# Patient Record
Sex: MALE | Race: ASIAN | NOT HISPANIC OR LATINO | URBAN - METROPOLITAN AREA
[De-identification: names, ages, dates, MRNs, and addresses within clinical notes are randomized per-mention and may not be internally consistent; named-entity substitution may affect disease eponyms.]

---

## 2017-04-06 VITALS
OXYGEN SATURATION: 98 % | HEART RATE: 89 BPM | TEMPERATURE: 98 F | DIASTOLIC BLOOD PRESSURE: 86 MMHG | RESPIRATION RATE: 14 BRPM | SYSTOLIC BLOOD PRESSURE: 174 MMHG | HEIGHT: 66 IN

## 2017-04-06 RX ORDER — CHLORHEXIDINE GLUCONATE 213 G/1000ML
1 SOLUTION TOPICAL ONCE
Qty: 0 | Refills: 0 | Status: DISCONTINUED | OUTPATIENT
Start: 2017-04-11 | End: 2017-04-11

## 2017-04-06 NOTE — H&P ADULT - HISTORY OF PRESENT ILLNESS
SKELETON    58 yo M PMHx *SEVERE CONTRAST DYE ALLERGY--needs rx premedication* CAD s/p MI (7 y ago, [facility]), DM, HTN, HLD, presented to cardiologist for pre-operative clearance prior to colonoscopy. Pt admits to some MOREIRA relieved with rest, and also states that he was recommended for ?CABG but could not 2/2 dye allergy.   Denies  Echo 3/7/17 dilated LA LVH with normal LV wall motion, LVEF 62% mild aortic and mitral regurg, abnormal LV compliance  NST 3/9/17 small area of inferior wall ischemia   In light of CCS III angina symptoms and abnormal NST patient recommended for cardiac catheterization with intervention if clinically indicated for cardiac clearance prior to colonoscopy. *****SKELETON- DOCTORS'S NOTE IN CONSISTENT WITH WHAT PATIENT"S FAMILY REPORTS.  PATIENT NOT AVAILABLE TO SPEAK WITH AT THE TIME.   ON ALL MEDICAL RECORDS MATCHES WITH PATIENT.          ***NEEDS PREMEDICATION UPON ARRIVAL       **PT TO BRING IN ALL MEDS       57 y.o Higinio Speaking Male with SEVERE CONTRAST DYE ALLERGY with PMHx of HTN, dyslipidemia, NIDDM, Known CAD s/p PCI Vs CABG @ [facility], s/p MI (7 y ago, [facility]), DM, HTN, HLD, presented to cardiologist for pre-operative clearance prior to colonoscopy. Pt admits to some MOREIRA relieved with rest, and also states that he was recommended for ?CABG but could not 2/2 dye allergy.   Denies  Echo 3/7/17 dilated LA LVH with normal LV wall motion, LVEF 62% mild aortic and mitral regurg, abnormal LV compliance  NST 3/9/17 small area of inferior wall ischemia   In light of CCS III angina symptoms and abnormal NST patient recommended for cardiac catheterization with intervention if clinically indicated for cardiac clearance prior to colonoscopy. *****SKELETON- DOCTORS'S NOTE IN CONSISTENT WITH WHAT PATIENT"S FAMILY REPORTS.  PATIENT NOT AVAILABLE TO SPEAK WITH AT THE TIME.   ON ALL MEDICAL RECORDS MATCHES WITH PATIENT.          ***NEEDS PREMEDICATION FOR CONTRAST DYE ALLERGY UPON ARRIVAL       **PT TO BRING IN ALL MEDS       57 y.o Higinio Speaking Male with SEVERE CONTRAST DYE ALLERGY with PMHx of HTN, dyslipidemia, NIDDM, Known CAD s/p PCI Vs CABG @ [facility], s/p MI (7 y ago, [facility]), DM, HTN, HLD, presented to cardiologist for pre-operative clearance prior to colonoscopy. Pt admits to some MOREIRA relieved with rest, and also states that he was recommended for ?CABG but could not 2/2 dye allergy.   Denies  Echo 3/7/17 dilated LA LVH with normal LV wall motion, LVEF 62% mild aortic and mitral regurg, abnormal LV compliance  NST 3/9/17 small area of inferior wall ischemia   In light of CCS III angina symptoms and abnormal NST patient recommended for cardiac catheterization with intervention if clinically indicated for cardiac clearance prior to colonoscopy. ***NEEDS PREMEDICATION FOR CONTRAST DYE ALLERGY UPON ARRIVAL     57 y.o Higinio Speaking Male POOR HISTORIAN, with CONTRAST DYE ALLERGY with PMHx of HTN, dyslipidemia, NIDDM, ?Diagnostic Cath @ [facility] > 10yrs ago (No record @ [place]) who presented to his cardiologist, Dr. Arash Blake, for  routine annual Cardiac follow-up.  Pt reports doing well  with no recent decline in his exercise tolerance.  He states  he walks daily       OF NOTE:  As per MD note, during office consultation, pt reported MOREIRA .  Echo 3/7/17 revealed  dilated LA LVH with normal LV wall motion, LVEF 62% mild aortic and mitral regurg, abnormal LV compliance.  Pt also had a Nuclear Stress test done on 03/9/17  revealing small area of inferior wall ischemia.       In light of pt's risk factors, Cd abnormal NST patient recommended for cardiac catheterization with intervention if clinically indicated for cardiac clearance prior to colonoscopy. ***NEEDS PREMEDICATION FOR CONTRAST DYE ALLERGY UPON ARRIVAL       57 y.o Higinio Speaking Male POOR HISTORIAN, with CONTRAST DYE ALLERGY with PMHx of HTN, dyslipidemia, NIDDM, ?Diagnostic Cath @ [facility] > 10yrs ago (No record @ [place]) who presented to his cardiologist, Dr. Arash Blake, for  cardiac clearance prior to anticipated Colonoscopy  Pt reports doing well  with no recent decline in his exercise tolerance.  He states  he walks several blocks daily as part of his exercise regimen with out illiciting  any anginal or anginal equivalent symptoms.  OF NOTE:  As per MD note, during office consultation pt reported MOREIRA .  Echo 3/7/17 revealed  dilated LA LVH with normal LV wall motion, LVEF 62% mild aortic and mitral regurg, abnormal LV compliance.  Pt also had a Nuclear Stress test done on 03/9/17  revealing small area of inferior wall ischemia.       In light of pt's risk factors, CCS Anginal Class 3 Equivalent Symptoms,  and abnormal Stress test, patient is recommended for Cardiac Catheterization with intervention if clinically indicated  in the setting of risk stratification prior to colonoscopy. ***NEEDS PREMEDICATION FOR CONTRAST DYE ALLERGY UPON ARRIVAL       57 y.o Higinio Speaking Male POOR HISTORIAN, with CONTRAST DYE ALLERGY with PMHx of HTN, dyslipidemia, NIDDM, ?Diagnostic Cath @ [facility] > 10yrs ago (No record @ [place]) who was recently seen and edvalauted  presented to his cardiologist, Dr. Arash Blake, for  cardiac clearance prior to anticipated Colonoscopy  Pt reports doing well  with no recent decline in his exercise tolerance.  He states  he walks several blocks daily as part of his exercise regimen with out illiciting  any anginal or anginal equivalent symptoms.  OF NOTE:  As per MD note, during office consultation pt reported MOREIRA .  Echo 3/7/17 revealed  dilated LA LVH with normal LV wall motion, LVEF 62% mild aortic and mitral regurg, abnormal LV compliance.  Pt also had a Nuclear Stress test done on 03/9/17  revealing small area of inferior wall ischemia.       In light of pt's risk factors, CCS Anginal Class 3 Equivalent Symptoms,  and abnormal Stress test, patient is recommended for Cardiac Catheterization with intervention if clinically indicated  in the setting of risk stratification prior to colonoscopy. ***NEEDS PREMEDICATION FOR CONTRAST DYE ALLERGY UPON ARRIVAL       57 y.o Higinio Speaking Male POOR HISTORIAN, with CONTRAST DYE ALLERGY with PMHx of HTN, dyslipidemia, NIDDM, ?Diagnostic Cath @ [facility] > 10yrs ago (No record @ [place]) who was recently seen and evaluated  by his cardiologist, Dr. Arash Blake, for  cardiac clearance prior to anticipated Colonoscopy  Pt reports doing well  with no recent decline in his exercise tolerance.  He states  he walks several blocks daily as part of his exercise regimen with out illiciting  any anginal or anginal equivalent symptoms.  OF NOTE:  As per MD note, during office consultation pt reported MOREIRA .  Echo 3/7/17 revealed  dilated LA LVH with normal LV wall motion, LVEF 62% mild aortic and mitral regurg, abnormal LV compliance.  Pt also had a Nuclear Stress test done on 03/9/17  revealing small area of inferior wall ischemia.       In light of pt's risk factors, CCS Anginal Class 3 Equivalent Symptoms,  and abnormal Stress test, patient is recommended for Cardiac Catheterization with intervention if clinically indicated  in the setting of risk stratification prior to colonoscopy. ***NEEDS PREMEDICATION FOR CONTRAST DYE ALLERGY UPON ARRIVAL       57 y.o Higinio Speaking Male POOR HISTORIAN, with CONTRAST DYE ALLERGY with PMHx of HTN, dyslipidemia, NIDDM, ?Diagnostic Cath @ [facility] > 10yrs ago during which time procedure was aborted due contrast dye allergy  (No record @ [place]) who was recently seen and evaluated  by his cardiologist, Dr. Arash Blake, for  cardiac clearance prior to anticipated Colonoscopy.   Pt reports doing well  with no recent decline in his exercise tolerance.  He states he walks several blocks daily as part of his exercise regimen with out illiciting  any anginal or anginal equivalent symptoms.  OF NOTE:  As per MD note, during office consultation pt reported MOREIRA .  Echo 3/7/17 revealed  dilated LA, LVH with normal LV wall motion, LVEF 62% mild aortic and mitral regurg, abnormal LV compliance.  Pt also had a Nuclear Stress test done on 03/9/17  revealing small area of inferior wall ischemia.       In light of pt's risk factors, CCS Anginal Class 3 Equivalent Symptoms,  and abnormal Stress test, patient is recommended for Cardiac Catheterization with intervention if clinically indicated  in the setting of risk stratification prior to colonoscopy. ***NEEDS PREMEDICATION FOR CONTRAST DYE ALLERGY UPON ARRIVAL       57 y.o Higinio Speaking Male POOR HISTORIAN, with CONTRAST DYE ALLERGY, PMHx of HTN, dyslipidemia, NIDDM, ?Diagnostic Cath @ [facility] > 10yrs ago during which time procedure was aborted due contrast dye allergy  (No record @ [place]) who was recently seen and evaluated  by his cardiologist, Dr. Arash Blake, for  cardiac clearance prior to anticipated Colonoscopy.   Pt reports doing well  with no recent decline in his exercise tolerance.  He states he walks several blocks daily as part of his exercise regimen with out illiciting  any anginal or anginal equivalent symptoms.  OF NOTE:  As per MD note, during office consultation pt reported MOREIRA .  Echo 3/7/17 revealed  dilated LA, LVH with normal LV wall motion, LVEF 62% mild aortic and mitral regurg, abnormal LV compliance.  Pt also had a Nuclear Stress test done on 03/9/17  revealing small area of inferior wall ischemia.       In light of pt's risk factors, CCS Anginal Class 3 Equivalent Symptoms,  and abnormal Stress test, patient is recommended for Cardiac Catheterization with intervention if clinically indicated  in the setting of risk stratification prior to colonoscopy. 57 y.o Higinio Speaking Male POOR HISTORIAN, with CONTRAST DYE ALLERGY, PMHx of HTN, dyslipidemia, NIDDM, ?Diagnostic Cath @ [facility] > 10yrs ago during which time procedure was aborted due contrast dye allergy  (No record @ [place]) who was recently seen and evaluated  by his cardiologist, Dr. Arash lBake, for  cardiac clearance prior to anticipated Colonoscopy.   Pt reports doing well  with no recent decline in his exercise tolerance.  He states he walks several blocks daily as part of his exercise regimen with out illiciting  any anginal or anginal equivalent symptoms.  OF NOTE:  As per MD note, during office consultation pt reported MOREIRA .  Echo 3/7/17 revealed  dilated LA, LVH with normal LV wall motion, LVEF 62% mild aortic and mitral regurg, abnormal LV compliance.  Pt also had a Nuclear Stress test done on 03/9/17  revealing small area of inferior wall ischemia.       In light of pt's risk factors, CCS Anginal Class 3 Equivalent Symptoms,  and abnormal Stress test, patient is recommended for Cardiac Catheterization with intervention if clinically indicated  in the setting of risk stratification prior to colonoscopy.

## 2017-04-06 NOTE — H&P ADULT - ASSESSMENT
57 y.o Higinio Speaking Male POOR HISTORIAN, with CONTRAST DYE ALLERGY, PMHx of HTN, dyslipidemia, NIDDM, ?Diagnostic Cath @ [facility] > 10yrs ago during which time procedure was aborted due contrast dye allergy  (No record @ [place]) who presents for recommended cardiac cath for cardiac clearance prior to colonoscopy due to patient's risk factors, abnormal stress test, and CCS Anginal Class III Equivalent Symptoms.    ASA III                  Mallampati III  Risks & benefits of procedure and alternative therapy have been explained to the patient including but not limited to: allergic reaction, bleeding w/possible need for blood transfusion, infection, renal and vascular compromise, limb damage, arrhythmia, stroke, vessel dissection/perforation, Myocardial infarction, emergent CABG. Informed consent obtained and in chart.       Solucortef 200 mg IV x 1 ordered for premedication for contrast allergy. Benadryl 50 mg IV x 1 on call to cath lab. 57 y.o Higinio Speaking Male POOR HISTORIAN, with CONTRAST DYE ALLERGY, PMHx of HTN, dyslipidemia, NIDDM, ?Diagnostic Cath @ [facility] > 10yrs ago during which time procedure was aborted due contrast dye allergy  (No record @ [place]) who presents for recommended cardiac cath for cardiac clearance prior to colonoscopy due to patient's risk factors, abnormal stress test, and CCS Anginal Class III Equivalent Symptoms.    ASA III                  Mallampati III  Risks & benefits of procedure and alternative therapy have been explained to the patient including but not limited to: allergic reaction, bleeding w/possible need for blood transfusion, infection, renal and vascular compromise, limb damage, arrhythmia, stroke, vessel dissection/perforation, Myocardial infarction, emergent CABG. Informed consent obtained and in chart.       Solucortef 200 mg IV x 1 ordered for premedication for contrast allergy. Benadryl 50 mg IV x 1 on call to cath lab.     CBC stable Hgb/HCT 13.8/39.8-ASA  mg PO x 1 and Plavix 600 mg PO x given prior to procedure 57 y.o Higinio Speaking Male POOR HISTORIAN, with CONTRAST DYE ALLERGY, PMHx of HTN, dyslipidemia, NIDDM, ?Diagnostic Cath @ [facility] > 10yrs ago during which time procedure was aborted due contrast dye allergy  (No record @ [place]) who presents for recommended cardiac cath for cardiac clearance prior to colonoscopy due to patient's risk factors, abnormal stress test, and CCS Anginal Class III Equivalent Symptoms.    ASA III                  Mallampati III  Risks & benefits of procedure and alternative therapy have been explained to the patient including but not limited to: allergic reaction, bleeding w/possible need for blood transfusion, infection, renal and vascular compromise, limb damage, arrhythmia, stroke, vessel dissection/perforation, Myocardial infarction, emergent CABG. Informed consent obtained and in chart.     EKG : NSR at 89 bpm. 0.5 mm J point elevation V2-V3. TWI AVL. +LVH  Solucortef 200 mg IV x 1 ordered for premedication for contrast allergy. Benadryl 50 mg IV x 1 on call to cath lab.     CBC stable Hgb/HCT 13.8/39.8-ASA  mg PO x 1 and Plavix 600 mg PO x given prior to procedure

## 2017-04-10 RX ORDER — LOSARTAN POTASSIUM 100 MG/1
1 TABLET, FILM COATED ORAL
Qty: 0 | Refills: 0 | COMMUNITY

## 2017-04-10 RX ORDER — METOPROLOL TARTRATE 50 MG
1 TABLET ORAL
Qty: 0 | Refills: 0 | COMMUNITY

## 2017-04-10 RX ORDER — ASPIRIN/CALCIUM CARB/MAGNESIUM 324 MG
1 TABLET ORAL
Qty: 0 | Refills: 0 | COMMUNITY

## 2017-04-10 RX ORDER — GABAPENTIN 400 MG/1
0 CAPSULE ORAL
Qty: 0 | Refills: 0 | COMMUNITY

## 2017-04-10 RX ORDER — ISOSORBIDE MONONITRATE 60 MG/1
1 TABLET, EXTENDED RELEASE ORAL
Qty: 0 | Refills: 0 | COMMUNITY

## 2017-04-11 ENCOUNTER — OUTPATIENT (OUTPATIENT)
Dept: OUTPATIENT SERVICES | Facility: HOSPITAL | Age: 57
LOS: 1 days | Discharge: NURSING, REHAB OR LONG-TERM CARE | End: 2017-04-11
Payer: COMMERCIAL

## 2017-04-11 DIAGNOSIS — I25.110 ATHEROSCLEROTIC HEART DISEASE OF NATIVE CORONARY ARTERY WITH UNSTABLE ANGINA PECTORIS: ICD-10-CM

## 2017-04-11 DIAGNOSIS — E78.5 HYPERLIPIDEMIA, UNSPECIFIED: ICD-10-CM

## 2017-04-11 DIAGNOSIS — I10 ESSENTIAL (PRIMARY) HYPERTENSION: ICD-10-CM

## 2017-04-11 DIAGNOSIS — E11.9 TYPE 2 DIABETES MELLITUS WITHOUT COMPLICATIONS: ICD-10-CM

## 2017-04-11 DIAGNOSIS — R94.39 ABNORMAL RESULT OF OTHER CARDIOVASCULAR FUNCTION STUDY: ICD-10-CM

## 2017-04-11 LAB
ALBUMIN SERPL ELPH-MCNC: 4.1 G/DL — SIGNIFICANT CHANGE UP (ref 3.4–5)
ALP SERPL-CCNC: 72 U/L — SIGNIFICANT CHANGE UP (ref 40–120)
ALT FLD-CCNC: 35 U/L — SIGNIFICANT CHANGE UP (ref 12–42)
ANION GAP SERPL CALC-SCNC: 6 MMOL/L — LOW (ref 9–16)
APTT BLD: 33.4 SEC — SIGNIFICANT CHANGE UP (ref 27.5–37.4)
AST SERPL-CCNC: 15 U/L — SIGNIFICANT CHANGE UP (ref 15–37)
BASOPHILS NFR BLD AUTO: 0.3 % — SIGNIFICANT CHANGE UP (ref 0–2)
BILIRUB DIRECT SERPL-MCNC: 0.3 MG/DL — HIGH
BILIRUB INDIRECT FLD-MCNC: 0.1 MG/DL — LOW (ref 0.2–1)
BILIRUB SERPL-MCNC: 0.4 MG/DL — SIGNIFICANT CHANGE UP (ref 0.2–1.2)
BUN SERPL-MCNC: 15 MG/DL — SIGNIFICANT CHANGE UP (ref 7–23)
CALCIUM SERPL-MCNC: 9.5 MG/DL — SIGNIFICANT CHANGE UP (ref 8.5–10.5)
CHLORIDE SERPL-SCNC: 108 MMOL/L — SIGNIFICANT CHANGE UP (ref 96–108)
CHOLEST SERPL-MCNC: 163 MG/DL — SIGNIFICANT CHANGE UP
CK MB CFR SERPL CALC: 1.1 NG/ML — SIGNIFICANT CHANGE UP (ref 0.5–3.6)
CO2 SERPL-SCNC: 28 MMOL/L — SIGNIFICANT CHANGE UP (ref 22–31)
CREAT SERPL-MCNC: 0.77 MG/DL — SIGNIFICANT CHANGE UP (ref 0.5–1.3)
CRP SERPL-MCNC: <0.29 MG/DL — SIGNIFICANT CHANGE UP
EOSINOPHIL NFR BLD AUTO: 6.2 % — HIGH (ref 0–6)
GLUCOSE SERPL-MCNC: 146 MG/DL — HIGH (ref 70–99)
HBA1C BLD-MCNC: 6.6 % — HIGH (ref 4.8–5.6)
HCT VFR BLD CALC: 39.3 % — SIGNIFICANT CHANGE UP (ref 39–50)
HDLC SERPL-MCNC: 58 MG/DL — SIGNIFICANT CHANGE UP
HGB BLD-MCNC: 13.8 G/DL — SIGNIFICANT CHANGE UP (ref 13–17)
INR BLD: 0.96 — SIGNIFICANT CHANGE UP (ref 0.88–1.16)
LIPID PNL WITH DIRECT LDL SERPL: 82 MG/DL — SIGNIFICANT CHANGE UP
LYMPHOCYTES # BLD AUTO: 54.3 % — HIGH (ref 13–44)
MCHC RBC-ENTMCNC: 31.9 PG — SIGNIFICANT CHANGE UP (ref 27–34)
MCHC RBC-ENTMCNC: 35.1 G/DL — SIGNIFICANT CHANGE UP (ref 32–36)
MCV RBC AUTO: 91 FL — SIGNIFICANT CHANGE UP (ref 80–100)
MONOCYTES NFR BLD AUTO: 7.7 % — SIGNIFICANT CHANGE UP (ref 2–14)
NEUTROPHILS NFR BLD AUTO: 31.5 % — LOW (ref 43–77)
PLATELET # BLD AUTO: 159 K/UL — SIGNIFICANT CHANGE UP (ref 150–400)
POTASSIUM SERPL-MCNC: 4.2 MMOL/L — SIGNIFICANT CHANGE UP (ref 3.5–5.3)
POTASSIUM SERPL-SCNC: 4.2 MMOL/L — SIGNIFICANT CHANGE UP (ref 3.5–5.3)
PROT SERPL-MCNC: 7.8 G/DL — SIGNIFICANT CHANGE UP (ref 6.4–8.2)
PROTHROM AB SERPL-ACNC: 10.7 SEC — SIGNIFICANT CHANGE UP (ref 9.8–12.7)
RBC # BLD: 4.32 M/UL — SIGNIFICANT CHANGE UP (ref 4.2–5.8)
RBC # FLD: 12.4 % — SIGNIFICANT CHANGE UP (ref 10.3–16.9)
SODIUM SERPL-SCNC: 142 MMOL/L — SIGNIFICANT CHANGE UP (ref 135–145)
TOTAL CHOLESTEROL/HDL RATIO MEASUREMENT: 2.8 RATIO — SIGNIFICANT CHANGE UP
TRIGL SERPL-MCNC: 115 MG/DL — SIGNIFICANT CHANGE UP
WBC # BLD: 6.1 K/UL — SIGNIFICANT CHANGE UP (ref 3.8–10.5)
WBC # FLD AUTO: 6.1 K/UL — SIGNIFICANT CHANGE UP (ref 3.8–10.5)

## 2017-04-11 PROCEDURE — 80061 LIPID PANEL: CPT

## 2017-04-11 PROCEDURE — 80048 BASIC METABOLIC PNL TOTAL CA: CPT

## 2017-04-11 PROCEDURE — C1769: CPT

## 2017-04-11 PROCEDURE — 85730 THROMBOPLASTIN TIME PARTIAL: CPT

## 2017-04-11 PROCEDURE — 93005 ELECTROCARDIOGRAM TRACING: CPT

## 2017-04-11 PROCEDURE — 82553 CREATINE MB FRACTION: CPT

## 2017-04-11 PROCEDURE — 82550 ASSAY OF CK (CPK): CPT

## 2017-04-11 PROCEDURE — 36415 COLL VENOUS BLD VENIPUNCTURE: CPT

## 2017-04-11 PROCEDURE — 85025 COMPLETE CBC W/AUTO DIFF WBC: CPT

## 2017-04-11 PROCEDURE — C1887: CPT

## 2017-04-11 PROCEDURE — 80076 HEPATIC FUNCTION PANEL: CPT

## 2017-04-11 PROCEDURE — 85610 PROTHROMBIN TIME: CPT

## 2017-04-11 PROCEDURE — 83036 HEMOGLOBIN GLYCOSYLATED A1C: CPT

## 2017-04-11 PROCEDURE — 93458 L HRT ARTERY/VENTRICLE ANGIO: CPT

## 2017-04-11 PROCEDURE — 93458 L HRT ARTERY/VENTRICLE ANGIO: CPT | Mod: 26

## 2017-04-11 PROCEDURE — 93010 ELECTROCARDIOGRAM REPORT: CPT

## 2017-04-11 PROCEDURE — 86140 C-REACTIVE PROTEIN: CPT

## 2017-04-11 RX ORDER — GLUCAGON INJECTION, SOLUTION 0.5 MG/.1ML
1 INJECTION, SOLUTION SUBCUTANEOUS ONCE
Qty: 0 | Refills: 0 | Status: DISCONTINUED | OUTPATIENT
Start: 2017-04-11 | End: 2017-04-11

## 2017-04-11 RX ORDER — ASPIRIN/CALCIUM CARB/MAGNESIUM 324 MG
325 TABLET ORAL ONCE
Qty: 0 | Refills: 0 | Status: COMPLETED | OUTPATIENT
Start: 2017-04-11 | End: 2017-04-11

## 2017-04-11 RX ORDER — DEXTROSE 50 % IN WATER 50 %
12.5 SYRINGE (ML) INTRAVENOUS ONCE
Qty: 0 | Refills: 0 | Status: DISCONTINUED | OUTPATIENT
Start: 2017-04-11 | End: 2017-04-11

## 2017-04-11 RX ORDER — CLOPIDOGREL BISULFATE 75 MG/1
600 TABLET, FILM COATED ORAL ONCE
Qty: 0 | Refills: 0 | Status: COMPLETED | OUTPATIENT
Start: 2017-04-11 | End: 2017-04-11

## 2017-04-11 RX ORDER — DEXTROSE 50 % IN WATER 50 %
25 SYRINGE (ML) INTRAVENOUS ONCE
Qty: 0 | Refills: 0 | Status: DISCONTINUED | OUTPATIENT
Start: 2017-04-11 | End: 2017-04-11

## 2017-04-11 RX ORDER — HYDROCORTISONE 20 MG
200 TABLET ORAL ONCE
Qty: 0 | Refills: 0 | Status: COMPLETED | OUTPATIENT
Start: 2017-04-11 | End: 2017-04-11

## 2017-04-11 RX ORDER — SODIUM CHLORIDE 9 MG/ML
500 INJECTION INTRAMUSCULAR; INTRAVENOUS; SUBCUTANEOUS
Qty: 0 | Refills: 0 | Status: DISCONTINUED | OUTPATIENT
Start: 2017-04-11 | End: 2017-04-11

## 2017-04-11 RX ORDER — DEXTROSE 50 % IN WATER 50 %
1 SYRINGE (ML) INTRAVENOUS ONCE
Qty: 0 | Refills: 0 | Status: DISCONTINUED | OUTPATIENT
Start: 2017-04-11 | End: 2017-04-11

## 2017-04-11 RX ORDER — SODIUM CHLORIDE 9 MG/ML
1000 INJECTION, SOLUTION INTRAVENOUS
Qty: 0 | Refills: 0 | Status: DISCONTINUED | OUTPATIENT
Start: 2017-04-11 | End: 2017-04-11

## 2017-04-11 RX ORDER — SODIUM CHLORIDE 9 MG/ML
1000 INJECTION INTRAMUSCULAR; INTRAVENOUS; SUBCUTANEOUS
Qty: 0 | Refills: 0 | Status: DISCONTINUED | OUTPATIENT
Start: 2017-04-11 | End: 2017-04-11

## 2017-04-11 RX ORDER — FUROSEMIDE 40 MG
1 TABLET ORAL
Qty: 30 | Refills: 3 | OUTPATIENT
Start: 2017-04-11 | End: 2017-08-08

## 2017-04-11 RX ORDER — INSULIN LISPRO 100/ML
VIAL (ML) SUBCUTANEOUS ONCE
Qty: 0 | Refills: 0 | Status: COMPLETED | OUTPATIENT
Start: 2017-04-11 | End: 2017-04-11

## 2017-04-11 RX ORDER — DIPHENHYDRAMINE HCL 50 MG
50 CAPSULE ORAL ONCE
Qty: 0 | Refills: 0 | Status: DISCONTINUED | OUTPATIENT
Start: 2017-04-11 | End: 2017-04-11

## 2017-04-11 RX ADMIN — SODIUM CHLORIDE 75 MILLILITER(S): 9 INJECTION INTRAMUSCULAR; INTRAVENOUS; SUBCUTANEOUS at 09:43

## 2017-04-11 RX ADMIN — CLOPIDOGREL BISULFATE 600 MILLIGRAM(S): 75 TABLET, FILM COATED ORAL at 10:04

## 2017-04-11 RX ADMIN — Medication 325 MILLIGRAM(S): at 10:04

## 2017-04-11 RX ADMIN — Medication 200 MILLIGRAM(S): at 09:43

## 2017-04-11 NOTE — PROGRESS NOTE ADULT - SUBJECTIVE AND OBJECTIVE BOX
Interventional Cardiology PA SDA Discharge Note    Patient without complaints. Ambulated and voided without difficulties    Afebrile, VSS    Ext:    	Right  Radial : no hematoma,  no   bleeding, dressing; C/D/I      Pulses:    intact RAD/DP/PT?to baseline     A/P:    57 y.o Higinio Speaking Male  with CONTRAST DYE ALLERGY, PMHx of HTN, dyslipidemia, NIDDM, ?Diagnostic Cath @ [facility] > 10yrs ago during which time procedure was aborted due contrast dye allergy  (No record @ [place]) who presents for recommended cardiac cath for cardiac clearance prior to colonoscopy due to patient's risk factors, abnormal stress test, and CCS Anginal Class III Equivalent Symptoms.    S/p diagnostic cardiac cath today revealing non obstructive CAD and elevated filling pressures  to be discharged home on medical management with addition of lasix 20mg po daily as per Dr Blake      1.	Stable for discharge as per attending Dr. Benoit  after  radial band removal if wrist stable and 30 minutes of ambulation.  2.	Follow-up with PMD/Cardiologist __Dr ELOINA Blake  in 1-2 weeks  3.	Discharged forms signed and copies in chart

## 2019-07-08 PROBLEM — I10 ESSENTIAL (PRIMARY) HYPERTENSION: Chronic | Status: ACTIVE | Noted: 2017-04-06

## 2019-07-08 PROBLEM — I25.10 ATHEROSCLEROTIC HEART DISEASE OF NATIVE CORONARY ARTERY WITHOUT ANGINA PECTORIS: Chronic | Status: ACTIVE | Noted: 2017-04-06

## 2019-07-08 PROBLEM — E11.9 TYPE 2 DIABETES MELLITUS WITHOUT COMPLICATIONS: Chronic | Status: ACTIVE | Noted: 2017-04-06

## 2019-07-08 PROBLEM — E78.5 HYPERLIPIDEMIA, UNSPECIFIED: Chronic | Status: ACTIVE | Noted: 2017-04-06

## 2019-07-09 PROBLEM — Z00.00 ENCOUNTER FOR PREVENTIVE HEALTH EXAMINATION: Status: ACTIVE | Noted: 2019-07-09

## 2019-07-25 ENCOUNTER — APPOINTMENT (OUTPATIENT)
Dept: ORTHOPEDIC SURGERY | Facility: CLINIC | Age: 59
End: 2019-07-25
Payer: MEDICAID

## 2019-07-25 VITALS
HEART RATE: 76 BPM | SYSTOLIC BLOOD PRESSURE: 150 MMHG | BODY MASS INDEX: 31.34 KG/M2 | DIASTOLIC BLOOD PRESSURE: 77 MMHG | HEIGHT: 66 IN | WEIGHT: 195 LBS

## 2019-07-25 DIAGNOSIS — M47.816 SPONDYLOSIS W/OUT MYELOPATHY OR RADICULOPATHY, LUMBAR REGION: ICD-10-CM

## 2019-07-25 DIAGNOSIS — M54.5 LOW BACK PAIN: ICD-10-CM

## 2019-07-25 PROCEDURE — 99204 OFFICE O/P NEW MOD 45 MIN: CPT

## 2019-07-25 PROCEDURE — 72100 X-RAY EXAM L-S SPINE 2/3 VWS: CPT

## 2019-07-25 PROCEDURE — 73502 X-RAY EXAM HIP UNI 2-3 VIEWS: CPT | Mod: RT

## 2019-07-25 RX ORDER — TRAMADOL HYDROCHLORIDE 50 MG/1
50 TABLET, COATED ORAL
Qty: 21 | Refills: 0 | Status: ACTIVE | COMMUNITY
Start: 2019-07-25 | End: 1900-01-01

## 2019-07-25 RX ORDER — DICLOFENAC SODIUM 75 MG/1
75 TABLET, DELAYED RELEASE ORAL
Qty: 60 | Refills: 0 | Status: ACTIVE | COMMUNITY
Start: 2019-07-25 | End: 1900-01-01

## 2019-07-30 ENCOUNTER — APPOINTMENT (OUTPATIENT)
Dept: ORTHOPEDIC SURGERY | Facility: CLINIC | Age: 59
End: 2019-07-30
Payer: MEDICAID

## 2019-07-30 VITALS
BODY MASS INDEX: 29.73 KG/M2 | DIASTOLIC BLOOD PRESSURE: 76 MMHG | WEIGHT: 185 LBS | HEIGHT: 66 IN | HEART RATE: 93 BPM | SYSTOLIC BLOOD PRESSURE: 161 MMHG

## 2019-07-30 DIAGNOSIS — Z78.9 OTHER SPECIFIED HEALTH STATUS: ICD-10-CM

## 2019-07-30 PROCEDURE — 72100 X-RAY EXAM L-S SPINE 2/3 VWS: CPT

## 2019-07-30 PROCEDURE — 99215 OFFICE O/P EST HI 40 MIN: CPT

## 2019-07-30 RX ORDER — ASPIRIN 325 MG/1
TABLET, FILM COATED ORAL
Refills: 0 | Status: ACTIVE | COMMUNITY

## 2019-07-31 ENCOUNTER — OTHER (OUTPATIENT)
Age: 59
End: 2019-07-31

## 2019-08-05 NOTE — DISCUSSION/SUMMARY
[de-identified] : Lumbar DJD with radiculopathy\par The natural history and treatment of degenerative arthritis was discussed with the patient at length today. The spectrum of treatment including nonoperative modalities to surgical intervention was elucidated. Noninvasive and nonoperative treatment modalities include weight reduction, activity modification with low impact exercise,  as needed use of acetaminophen or anti-inflammatory medications if tolerated, glucosamine/chondroitin supplements, and physical therapy. Further treatments can include corticosteroid injections. The risks and benefits of each treatment options was discussed and all questions were answered.\par The patient was informed of the findings and recommended conservative management in the form of a home exercise program, activity modifications, stationary bicycling, swimming and weight loss program. A trial of Glucosamine and Chondroiten Sulphate was recommended.\par A prescription for a course of physical therapy was provided.\par A prescription for non-steroidal anti-inflammatory medication Diclofenac was provided and the risks, benefits and side effects were discussed.\par A prescription for Tramadol provided for pain control. \par A prescription for an MRI of the lumbar spine provided to evaluate DJD and rule out stenosis. \par I have provided the patient with a referral to Dr. Vogt for evaluation of the lumbar spine. \par Follow-up appointment was recommended as needed.

## 2019-08-05 NOTE — CONSULT LETTER
[Dear  ___] : Dear  [unfilled], [Please see my note below.] : Please see my note below. [Consult Letter:] : I had the pleasure of evaluating your patient, [unfilled]. [Consult Closing:] : Thank you very much for allowing me to participate in the care of this patient.  If you have any questions, please do not hesitate to contact me. [Sincerely,] : Sincerely, [FreeTextEntry2] : EVELYNE GARCIA [FreeTextEntry3] : Sherman Leigh MD\par \par ______________________________________________\par [facility]: Hip/Knee Arthroplasty\par [address], [facility] [address] [facility] [address], [place] [address]\par (t) [phone]\par (f) [phone]

## 2019-08-05 NOTE — PHYSICAL EXAM
[Normal] : Gait: normal [LE] : 5/5 motor strength in bilateral lower extremities [Knee] : patellar 2+ and symmetric bilaterally [Ankle] : ankle 2+ and symmetric bilaterally [DP] : dorsalis pedis 2+ and symmetric bilaterally [PT] : posterior tibial 2+ and symmetric bilaterally [de-identified] : The following radiographs were ordered and read by me during this patients visit. I reviewed each radiograph in detail with the patient and discussed the findings as highlighted below. \par AP and false profile views of the right hip and AP view of the pelvis are within normal limits. \par AP and lateral views of the lumbar spine reveal DJD with loss of normal lordosis\par   [de-identified] : On general examination the patient is adequately groomed and nourished. The vital parameters are as recorded. \par There is no lymphedema or diffuse swelling, no varicose veins, no skin warmth/erythema/scars/swelling, no ulcers and no palpable lymph nodes or masses in both lower extremities. Bilateral pedal pulses are well palpable.\par Upper Extremity:\par Both right and left upper extremities are unremarkable in terms of skin rash, lesions, pigmentation, redness, tenderness, swelling, joint instability, abnormal deformity or crepitus. The overall range of motion, sensation, motor tone and strength testing are normal.\par \par Hip Exam:\par The gait and station is normal\par The patient has equal leg lengths and no pelvic tilt. Jacques/Thiago test is 7 inches on the right and 7 inches on the left. Active SLR is 60 degrees on the right and 60 degrees on the left. Both hips demonstrate no scars and the skin has no signs of inflammation or tenderness. \par Both Hips have a normal range of motion of flexion to 100 degrees, abduction 40 degrees, adduction 20 degrees, external rotation 40 degrees, internal rotation 20 degrees with symmetrical motion in flexion and extension. There is no flexion contracture, deformity or instability. Labral impingement tests are negative.\par Both hips flexor, abductor and extensor power is normal.\par \par Spine Exam:\par There is mild curvature of the spine with loss of normal lumbar lordosis. The skin is devoid of erythema, scars, pigmentation or rashes. There is mild lumbar spasm and tenderness especially at L4-L5 or L5-S1. \par The range of motion of the lumbar spine is limited by pain and spasm. Forward flexion is 80% normal, extension catch positive, lateral flexion and rotation 80% normal. There is no lumbar spine imbalance or instability detected.\par Bilateral passive SLR is right 40 degrees, left 40 degrees in supine and sitting positions. Lasegue's Test is negative.\par Neurology:\par The patient is alert and oriented in person, place and time. The mood is calm and affect is normal.\par Testing for coordination including Rhomberg's Test and Finger-Nose Test, sensation, motor tone and power and deep tendon reflexes in both lower extremities is normal.

## 2019-08-05 NOTE — HISTORY OF PRESENT ILLNESS
[7] : an average pain level of 7/10 [de-identified] : Mr. IVANIA CAMARENA is a 59 year old male presenting with right hip pain and lower back pain. Patient localizes the pain to the lateral and posterior aspect of the right hip and thigh. He denies true groin pain. He also has lower back pain that radiates down the right leg. Patient denies any falls or trauma. His symptoms are worsened with increased weightbearing activity such as walking long distance and standing for long periods of time. Patient has tried PT with minimal improvement. He has been taking NSAIDs with only temporary relief. He has not had injections. JTM. \par Patient denies any other pertinent past medical, surgical, familial, of social history.

## 2019-08-09 ENCOUNTER — FORM ENCOUNTER (OUTPATIENT)
Age: 59
End: 2019-08-09

## 2019-08-10 ENCOUNTER — OUTPATIENT (OUTPATIENT)
Dept: OUTPATIENT SERVICES | Facility: HOSPITAL | Age: 59
LOS: 1 days | End: 2019-08-10
Payer: MEDICAID

## 2019-08-10 ENCOUNTER — APPOINTMENT (OUTPATIENT)
Dept: MRI IMAGING | Facility: CLINIC | Age: 59
End: 2019-08-10
Payer: MEDICAID

## 2019-08-10 DIAGNOSIS — Z00.8 ENCOUNTER FOR OTHER GENERAL EXAMINATION: ICD-10-CM

## 2019-08-10 PROCEDURE — 72148 MRI LUMBAR SPINE W/O DYE: CPT

## 2019-08-10 PROCEDURE — 72148 MRI LUMBAR SPINE W/O DYE: CPT | Mod: 26

## 2019-09-05 ENCOUNTER — APPOINTMENT (OUTPATIENT)
Dept: ORTHOPEDIC SURGERY | Facility: CLINIC | Age: 59
End: 2019-09-05
Payer: MEDICAID

## 2019-09-05 DIAGNOSIS — M54.12 RADICULOPATHY, CERVICAL REGION: ICD-10-CM

## 2019-09-05 DIAGNOSIS — M47.812 SPONDYLOSIS W/OUT MYELOPATHY OR RADICULOPATHY, CERVICAL REGION: ICD-10-CM

## 2019-09-05 PROCEDURE — 99214 OFFICE O/P EST MOD 30 MIN: CPT

## 2019-09-21 ENCOUNTER — APPOINTMENT (OUTPATIENT)
Dept: MRI IMAGING | Facility: CLINIC | Age: 59
End: 2019-09-21

## 2019-10-12 ENCOUNTER — FORM ENCOUNTER (OUTPATIENT)
Age: 59
End: 2019-10-12

## 2019-10-13 ENCOUNTER — OUTPATIENT (OUTPATIENT)
Dept: OUTPATIENT SERVICES | Facility: HOSPITAL | Age: 59
LOS: 1 days | End: 2019-10-13
Payer: MEDICAID

## 2019-10-13 ENCOUNTER — APPOINTMENT (OUTPATIENT)
Dept: MRI IMAGING | Facility: CLINIC | Age: 59
End: 2019-10-13
Payer: MEDICAID

## 2019-10-13 DIAGNOSIS — M47.812 SPONDYLOSIS WITHOUT MYELOPATHY OR RADICULOPATHY, CERVICAL REGION: ICD-10-CM

## 2019-10-13 PROCEDURE — 72141 MRI NECK SPINE W/O DYE: CPT | Mod: 26

## 2019-10-13 PROCEDURE — 72141 MRI NECK SPINE W/O DYE: CPT

## 2019-11-07 ENCOUNTER — APPOINTMENT (OUTPATIENT)
Dept: ORTHOPEDIC SURGERY | Facility: CLINIC | Age: 59
End: 2019-11-07

## 2019-11-12 ENCOUNTER — APPOINTMENT (OUTPATIENT)
Dept: ORTHOPEDIC SURGERY | Facility: CLINIC | Age: 59
End: 2019-11-12

## 2019-11-15 ENCOUNTER — APPOINTMENT (OUTPATIENT)
Dept: ORTHOPEDIC SURGERY | Facility: CLINIC | Age: 59
End: 2019-11-15
Payer: MEDICAID

## 2019-11-15 VITALS — DIASTOLIC BLOOD PRESSURE: 61 MMHG | HEART RATE: 73 BPM | SYSTOLIC BLOOD PRESSURE: 131 MMHG

## 2019-11-15 PROCEDURE — 99214 OFFICE O/P EST MOD 30 MIN: CPT

## 2019-11-15 PROCEDURE — 72040 X-RAY EXAM NECK SPINE 2-3 VW: CPT

## 2019-11-18 ENCOUNTER — OTHER (OUTPATIENT)
Age: 59
End: 2019-11-18

## 2019-11-24 ENCOUNTER — FORM ENCOUNTER (OUTPATIENT)
Age: 59
End: 2019-11-24

## 2019-11-25 ENCOUNTER — APPOINTMENT (OUTPATIENT)
Dept: RADIOLOGY | Facility: HOSPITAL | Age: 59
End: 2019-11-25
Payer: MEDICAID

## 2019-11-25 ENCOUNTER — OUTPATIENT (OUTPATIENT)
Dept: OUTPATIENT SERVICES | Facility: HOSPITAL | Age: 59
LOS: 1 days | End: 2019-11-25
Payer: MEDICAID

## 2019-11-25 DIAGNOSIS — M99.83 OTHER BIOMECHANICAL LESIONS OF LUMBAR REGION: ICD-10-CM

## 2019-11-25 DIAGNOSIS — Z00.00 ENCOUNTER FOR GENERAL ADULT MEDICAL EXAMINATION WITHOUT ABNORMAL FINDINGS: ICD-10-CM

## 2019-11-25 PROCEDURE — 72132 CT LUMBAR SPINE W/DYE: CPT

## 2019-11-25 PROCEDURE — 72132 CT LUMBAR SPINE W/DYE: CPT | Mod: 26

## 2019-11-25 PROCEDURE — 62304 MYELOGRAPHY LUMBAR INJECTION: CPT

## 2019-12-20 ENCOUNTER — APPOINTMENT (OUTPATIENT)
Dept: ORTHOPEDIC SURGERY | Facility: CLINIC | Age: 59
End: 2019-12-20
Payer: MEDICAID

## 2019-12-20 VITALS — HEART RATE: 91 BPM | DIASTOLIC BLOOD PRESSURE: 71 MMHG | SYSTOLIC BLOOD PRESSURE: 155 MMHG

## 2019-12-20 DIAGNOSIS — M99.83 OTHER BIOMECHANICAL LESIONS OF LUMBAR REGION: ICD-10-CM

## 2019-12-20 DIAGNOSIS — M51.36 OTHER INTERVERTEBRAL DISC DEGENERATION, LUMBAR REGION: ICD-10-CM

## 2019-12-20 PROCEDURE — 99214 OFFICE O/P EST MOD 30 MIN: CPT

## 2020-01-02 ENCOUNTER — APPOINTMENT (OUTPATIENT)
Dept: ORTHOPEDIC SURGERY | Facility: CLINIC | Age: 60
End: 2020-01-02
Payer: MEDICAID

## 2020-01-02 VITALS
HEIGHT: 66 IN | DIASTOLIC BLOOD PRESSURE: 87 MMHG | WEIGHT: 185 LBS | HEART RATE: 82 BPM | SYSTOLIC BLOOD PRESSURE: 170 MMHG | BODY MASS INDEX: 29.73 KG/M2

## 2020-01-02 DIAGNOSIS — M79.651 PAIN IN RIGHT THIGH: ICD-10-CM

## 2020-01-02 PROCEDURE — 73552 X-RAY EXAM OF FEMUR 2/>: CPT | Mod: RT

## 2020-01-02 PROCEDURE — 73562 X-RAY EXAM OF KNEE 3: CPT | Mod: RT

## 2020-01-02 PROCEDURE — 99213 OFFICE O/P EST LOW 20 MIN: CPT

## 2020-01-07 NOTE — HISTORY OF PRESENT ILLNESS
[7] : an average pain level of 7/10 [Worsening] : worsening [Constant] : ~He/She~ states the symptoms seem to be constant [Walking] : worsened by walking [de-identified] : Mr. IVANIA CAMARENA is a 59 year old male, last evaluated in July 2019 for right hip and lower back pain, presenting with continued right thigh pain. He has been treating with Dr Vogt and receiving injection therapy. He notes his back pain is improved, but he continues to have constant pain along the anterior thigh, radiating to the knee. He notes his pain is constant, worsened by walking and bearing weight. He notes his last LACIE was on 10/25, which provided no relief from his thigh pain. He has been doing physical therapy as well with no relief. He presents for review. \par \par Patient denies any other pertinent past medical, surgical, familial, of social history.  [Rest] : not relieved with rest

## 2020-01-07 NOTE — PHYSICAL EXAM
[Normal] : Gait: normal [Knee] : patellar 2+ and symmetric bilaterally [Ankle] : ankle 2+ and symmetric bilaterally [LE] : Sensory: Intact in bilateral lower extremities [DP] : dorsalis pedis 2+ and symmetric bilaterally [PT] : posterior tibial 2+ and symmetric bilaterally [de-identified] : On general examination the patient is adequately groomed and nourished. The vital parameters are as recorded. \par There is no lymphedema or diffuse swelling, no varicose veins, no skin warmth/erythema/scars/swelling, no ulcers and no palpable lymph nodes or masses in both lower extremities. Bilateral pedal pulses are well palpable.\par Upper Extremity:\par Both right and left upper extremities are unremarkable in terms of skin rash, lesions, pigmentation, redness, tenderness, swelling, joint instability, abnormal deformity or crepitus. The overall range of motion, sensation, motor tone and strength testing are normal.\par \par Hip Exam:\par The gait and station is normal\par The patient has equal leg lengths and no pelvic tilt. Jacques/Thiago test is 7 inches on the right and 7 inches on the left. Active SLR is 60 degrees on the right and 60 degrees on the left. Both hips demonstrate no scars and the skin has no signs of inflammation or tenderness. There is tenderness to palpation along the right anterior thigh, and pain to the right thigh when ambulating \par Both Hips have a normal range of motion of flexion to 100 degrees, abduction 40 degrees, adduction 20 degrees, external rotation 40 degrees, internal rotation 20 degrees with symmetrical motion in flexion and extension. There is no flexion contracture, deformity or instability. Labral impingement tests are negative.\par Both hips flexor, abductor and extensor power is normal.\par \par Spine Exam:\par There is mild curvature of the spine with loss of normal lumbar lordosis. The skin is devoid of erythema, scars, pigmentation or rashes. There is mild lumbar spasm and tenderness especially at L4-L5 or L5-S1. \par The range of motion of the lumbar spine is limited by pain and spasm. Forward flexion is 80% normal, extension catch positive, lateral flexion and rotation 80% normal. There is no lumbar spine imbalance or instability detected.\par Bilateral passive SLR is right 40 degrees, left 40 degrees in supine and sitting positions. Lasegue's Test is negative.\par Neurology:\par The patient is alert and oriented in person, place and time. The mood is calm and affect is normal.\par Testing for coordination including Rhomberg's Test and Finger-Nose Test, sensation, motor tone and power and deep tendon reflexes in both lower extremities is normal. [de-identified] : The following radiographs were ordered and read by me during this patients visit. I reviewed each radiograph in detail with the patient and discussed the findings as highlighted below. \par AP and lateral views of the right femur are within normal limits. \par AP lateral and skyline views of the right knee are within normal limits. \par

## 2020-01-07 NOTE — DISCUSSION/SUMMARY
[de-identified] : Lumbar DJD with radiculopathy, with continued worsening right thigh pain despite treatment for the lower back. \par At this time the patient continues to have worsening thigh pain despite all treatment efforts. He has pain with ambulation localized to the right thigh, now present for many months. I have recommended MRI of the right thigh to evaluate for IT band tendonitis, along with bony abnormality. He will continue treatment with Dr. Vogt, along with physical therapy. \par He will continue with pain medications as needed, and follow up after the MRI.

## 2020-01-07 NOTE — CONSULT LETTER
[Consult Letter:] : I had the pleasure of evaluating your patient, [unfilled]. [Dear  ___] : Dear  [unfilled], [Please see my note below.] : Please see my note below. [Consult Closing:] : Thank you very much for allowing me to participate in the care of this patient.  If you have any questions, please do not hesitate to contact me. [Sincerely,] : Sincerely, [FreeTextEntry3] : Sherman Leigh MD\par \par ______________________________________________\par [facility]: Hip/Knee Arthroplasty\par [address], [address] [facility] [address], [place] [address]\par (t) [phone]\par (f) [phone]  [FreeTextEntry2] : EVELYNE GARCIA

## 2020-01-22 ENCOUNTER — FORM ENCOUNTER (OUTPATIENT)
Age: 60
End: 2020-01-22

## 2020-01-23 ENCOUNTER — APPOINTMENT (OUTPATIENT)
Dept: MRI IMAGING | Facility: CLINIC | Age: 60
End: 2020-01-23
Payer: MEDICAID

## 2020-01-23 ENCOUNTER — OUTPATIENT (OUTPATIENT)
Dept: OUTPATIENT SERVICES | Facility: HOSPITAL | Age: 60
LOS: 1 days | End: 2020-01-23
Payer: MEDICAID

## 2020-01-23 DIAGNOSIS — Z00.8 ENCOUNTER FOR OTHER GENERAL EXAMINATION: ICD-10-CM

## 2020-01-23 PROCEDURE — 73718 MRI LOWER EXTREMITY W/O DYE: CPT

## 2020-01-23 PROCEDURE — 73718 MRI LOWER EXTREMITY W/O DYE: CPT | Mod: 26,RT

## 2020-02-06 ENCOUNTER — APPOINTMENT (OUTPATIENT)
Dept: ORTHOPEDIC SURGERY | Facility: CLINIC | Age: 60
End: 2020-02-06
Payer: MEDICAID

## 2020-02-06 VITALS
WEIGHT: 170 LBS | BODY MASS INDEX: 27.32 KG/M2 | HEART RATE: 82 BPM | SYSTOLIC BLOOD PRESSURE: 146 MMHG | DIASTOLIC BLOOD PRESSURE: 75 MMHG | HEIGHT: 66 IN

## 2020-02-06 PROCEDURE — 99213 OFFICE O/P EST LOW 20 MIN: CPT

## 2020-02-06 NOTE — DISCUSSION/SUMMARY
[de-identified] : Patient with continued right thigh pain despite treatment for the lower back, recent MRI revealing fatty signal rectus femoris muscle, possible mass. \par \par At this time the patient continues to have worsening thigh pain despite all treatment efforts. He has pain with ambulation localized to the right thigh, now present for many months. I have recommended consultation with my colleague Dr. Mata Avila, for further evaluation of the right thigh and review of the MRI results. Further contrast enhanced MRI of the right thigh is likely needed. \par He has been provided with a referral to Dr. Avila\par He may follow up as needed.

## 2020-02-06 NOTE — PHYSICAL EXAM
[Normal] : Gait: normal [LE] : Sensory: Intact in bilateral lower extremities [Knee] : patellar 2+ and symmetric bilaterally [Ankle] : ankle 2+ and symmetric bilaterally [DP] : dorsalis pedis 2+ and symmetric bilaterally [PT] : posterior tibial 2+ and symmetric bilaterally [de-identified] : The following radiographs that were taken last visit, have been reviewed  by me again during this patients visit. I reviewed each radiograph in detail with the patient and discussed the findings as highlighted below. \par AP and lateral views of the right femur are within normal limits. \par AP lateral and skyline views of the right knee are within normal limits. \par  \par MRI of the right thigh has been reviewed today as well.  [de-identified] : On general examination the patient is adequately groomed and nourished. The vital parameters are as recorded. \par There is no lymphedema or diffuse swelling, no varicose veins, no skin warmth/erythema/scars/swelling, no ulcers and no palpable lymph nodes or masses in both lower extremities. Bilateral pedal pulses are well palpable.\par Upper Extremity:\par Both right and left upper extremities are unremarkable in terms of skin rash, lesions, pigmentation, redness, tenderness, swelling, joint instability, abnormal deformity or crepitus. The overall range of motion, sensation, motor tone and strength testing are normal.\par \par Hip Exam:\par The gait and station is normal\par The patient has equal leg lengths and no pelvic tilt. Jacques/Thiago test is 7 inches on the right and 7 inches on the left. Active SLR is 60 degrees on the right and 60 degrees on the left. Both hips demonstrate no scars and the skin has no signs of inflammation or tenderness. There is tenderness to palpation along the right anterior thigh, along the lateral right anterior thigh, and pain to the right thigh when ambulating \par Both Hips have a normal range of motion of flexion to 100 degrees, abduction 40 degrees, adduction 20 degrees, external rotation 40 degrees, internal rotation 20 degrees with symmetrical motion in flexion and extension. There is no flexion contracture, deformity or instability. Labral impingement tests are negative.\par Both hips flexor, abductor and extensor power is normal.\par \par Spine Exam:\par There is mild curvature of the spine with loss of normal lumbar lordosis. The skin is devoid of erythema, scars, pigmentation or rashes. There is mild lumbar spasm and tenderness especially at L4-L5 or L5-S1. \par The range of motion of the lumbar spine is limited by pain and spasm. Forward flexion is 80% normal, extension catch positive, lateral flexion and rotation 80% normal. There is no lumbar spine imbalance or instability detected.\par Bilateral passive SLR is right 40 degrees, left 40 degrees in supine and sitting positions. Lasegue's Test is negative.\par Neurology:\par The patient is alert and oriented in person, place and time. The mood is calm and affect is normal.\par Testing for coordination including Rhomberg's Test and Finger-Nose Test, sensation, motor tone and power and deep tendon reflexes in both lower extremities is normal.

## 2020-02-06 NOTE — CONSULT LETTER
[Dear  ___] : Dear  [unfilled], [Consult Letter:] : I had the pleasure of evaluating your patient, [unfilled]. [Please see my note below.] : Please see my note below. [Consult Closing:] : Thank you very much for allowing me to participate in the care of this patient.  If you have any questions, please do not hesitate to contact me. [Sincerely,] : Sincerely, [FreeTextEntry2] : EVELYNE GARCIA [FreeTextEntry3] : Sherman Leigh MD\par \par ______________________________________________\par [facility]: Hip/Knee Arthroplasty\par [address], [address] [facility] [address], [place] [address]\par (t) [phone]\par (f) [phone]

## 2020-02-06 NOTE — HISTORY OF PRESENT ILLNESS
[Worsening] : worsening [7] : an average pain level of 7/10 [Constant] : ~He/She~ states the symptoms seem to be constant [Walking] : worsened by walking [Rest] : not relieved with rest [de-identified] : Mr. IVANIA CAMARENA is a 59 year old male, last evaluated in July 2019 for right hip and lower back pain, presenting with continued right thigh pain. He has been treating with Dr Vogt and receiving injection therapy. He notes his back pain is improved, but he continues to have constant pain along the anterior thigh, radiating to the knee. He notes his pain is constant, worsened by walking and bearing weight. He notes his last LACIE was on 10/25, which provided no relief from his thigh pain. He has been doing physical therapy as well with no relief. Since his last visit, he has had the MRI of the right thigh, and returns for results. He notes his thigh pain has not improved. He localizes the pain to the lateral aspect, spanning the proximal to distal thigh. He notes the pain is sharp and constant. \par \par Patient denies any other pertinent past medical, surgical, familial, of social history.

## 2020-02-07 ENCOUNTER — APPOINTMENT (OUTPATIENT)
Dept: ORTHOPEDIC SURGERY | Facility: CLINIC | Age: 60
End: 2020-02-07
Payer: MEDICAID

## 2020-02-07 VITALS
WEIGHT: 170 LBS | BODY MASS INDEX: 27.32 KG/M2 | HEART RATE: 77 BPM | HEIGHT: 66 IN | SYSTOLIC BLOOD PRESSURE: 151 MMHG | DIASTOLIC BLOOD PRESSURE: 77 MMHG

## 2020-02-07 DIAGNOSIS — R22.41 LOCALIZED SWELLING, MASS AND LUMP, RIGHT LOWER LIMB: ICD-10-CM

## 2020-02-07 PROCEDURE — 99213 OFFICE O/P EST LOW 20 MIN: CPT

## 2020-02-08 NOTE — DISCUSSION/SUMMARY
[de-identified] : This is a 59M w/ chronic R thigh pain associated with lipomatous change of the mid-rectus femoris. This may be due to underlying nerve palsy, possibly related to lumbar foraminal stenosis. I am recommending a repeat MRI of the R thigh but with contrast to better assess this area and to  lipomatous change vs. lipoma. Given the neuropathic type pain he is having, I I have prescribed gabapentin. Will FU after MRI results are back. This plan was discussed in detail with the patient who was in full agreement. All questions were answered.

## 2020-02-08 NOTE — HISTORY OF PRESENT ILLNESS
[FreeTextEntry1] : This is a 59M w/ hx of DM, HLD, lumbar radiculopathy, who is presenting for evaluation of persistent R thigh pain. The pain began 6 months ago, radiating down the lateral aspect of the thigh, but not past the knee. Has been worked up for possible lumbar radiculopathy with MRI and epidural steroid injectionsX3 without relief. Has tried PT and NSAIDs without relief. The pain is 9/10 in severity, described as burning. Denies any trauma. Denies any recent fevers, unintentional weight loss or night sweats.

## 2020-02-08 NOTE — DATA REVIEWED
[de-identified] : R Femur Xray (1/2/20): +R hip mild DJD. No osseous lesions. \par MRI Spine:B/L L3-4 and L4-5 foraminal stenosis\par MR R thigh (1/23/20): 3.6X1.7X10.2cm isolated rectus femoris fatty signal change

## 2020-02-08 NOTE — PHYSICAL EXAM
[FreeTextEntry1] : General: well nourished, in no acute distress, alert and oriented to person, place and time.\par Psychiatric: normal mood and affect, no abnormal movements or speech patterns.\par Eyes: vision intact without deficits, sclera and conjunctiva were normal, pupils were equal in size. \par ENT: Ears and nose were normal in appearance. No thyromegaly.\par Lymph: no enlarged nodes, no lymphedema in extremity.\par Respiratory: Normal respiratory rhythm and effort. No wheezing detected without auscultation. No shortness of breath or respiratory distress.\par Cardiac: no cardiac related leg swelling, 2+ peripheral pulses.\par Neurology: normal gross sensation in extremities to light touch.\par Abdomen: soft, non-tender, tympanic, no masses.\par  \par Spine:\par Gait: ambulates fluidly without assistance. Able to heel and toe walk. \par Skin intact, no ecchymosis or cutaneous lesions. Overall alignment neutral.\par No TTP over spinous processes or paraspinal muscles. \par B/L L2-S1 5/5. \par B/L L2-S1 SILT.\par Negative SLR B/L\par Negative babinski \par No clonus or saddle anesthesia.\par Symmetric reflexes. \par \par RLE:\par \par Skin CDI. No effusion, ecchymosis or swelling. \par +TTP over mid-length of rectus femoris muscle. \par Negative Daryl's test.\par No TTP over GT. No palpable masses. No lymphedema.\par ROM: FF: 80. IR: 5. ER: 30. \par EHL/FHL/GS/TA 5/5. S/S/SP/DP/T SILT. Toes warm, BCR. Compartments soft. \par

## 2020-03-09 ENCOUNTER — RX RENEWAL (OUTPATIENT)
Age: 60
End: 2020-03-09

## 2020-03-20 ENCOUNTER — OUTPATIENT (OUTPATIENT)
Dept: OUTPATIENT SERVICES | Facility: HOSPITAL | Age: 60
LOS: 1 days | End: 2020-03-20
Payer: MEDICAID

## 2020-03-20 ENCOUNTER — APPOINTMENT (OUTPATIENT)
Dept: MRI IMAGING | Facility: CLINIC | Age: 60
End: 2020-03-20
Payer: MEDICAID

## 2020-03-20 ENCOUNTER — RESULT REVIEW (OUTPATIENT)
Age: 60
End: 2020-03-20

## 2020-03-20 DIAGNOSIS — M79.651 PAIN IN RIGHT THIGH: ICD-10-CM

## 2020-03-20 PROCEDURE — 73720 MRI LWR EXTREMITY W/O&W/DYE: CPT | Mod: 26,RT

## 2020-03-20 PROCEDURE — A9585: CPT

## 2020-03-20 PROCEDURE — 73720 MRI LWR EXTREMITY W/O&W/DYE: CPT

## 2023-06-13 ENCOUNTER — APPOINTMENT (OUTPATIENT)
Dept: ORTHOPEDIC SURGERY | Facility: CLINIC | Age: 63
End: 2023-06-13
Payer: MEDICAID

## 2023-06-13 VITALS
HEIGHT: 65 IN | BODY MASS INDEX: 22.04 KG/M2 | DIASTOLIC BLOOD PRESSURE: 71 MMHG | HEART RATE: 66 BPM | SYSTOLIC BLOOD PRESSURE: 119 MMHG | OXYGEN SATURATION: 98 % | WEIGHT: 132.28 LBS | TEMPERATURE: 97.4 F

## 2023-06-13 DIAGNOSIS — M25.551 PAIN IN RIGHT HIP: ICD-10-CM

## 2023-06-13 DIAGNOSIS — M54.16 RADICULOPATHY, LUMBAR REGION: ICD-10-CM

## 2023-06-13 PROCEDURE — 72100 X-RAY EXAM L-S SPINE 2/3 VWS: CPT

## 2023-06-13 PROCEDURE — 73502 X-RAY EXAM HIP UNI 2-3 VIEWS: CPT

## 2023-06-13 PROCEDURE — 99203 OFFICE O/P NEW LOW 30 MIN: CPT

## 2023-06-13 NOTE — DISCUSSION/SUMMARY
[de-identified] : The patient describes continued pain which radiates from his right thigh down to his right leg.  He has been seen by various physicians for several years without resolution of his symptoms.  He is not eager to take a course of NSAIDs or gabapentin as have been prescribed in the past.  He is not interested in physical therapy.  I have suggested he have neurologic evaluation.

## 2023-06-13 NOTE — HISTORY OF PRESENT ILLNESS
[de-identified] : 63 year old male presents for initial evaluation of chronic right hip pain worsening over the past year. Denies recent trauma or injury. He complains of constant sharp and burning pain from the lateral aspect of the hip radiating down the lateral aspect of the leg to the ankle. The pain is unchanged whether he is at rest or performing activities such as walking and climbing stairs. He has not been taking any medication for the pain. He ices the hip which gives him some temporary relief. He has associated numbness and tingling down the right leg. He has been seen by Dr. Leigh and Dr. Avila in the past for this, has had an MRI of the thigh which demonstrated some tearing of the gluteus medius tendon.

## 2023-06-13 NOTE — PHYSICAL EXAM
[DP] : dorsalis pedis 2+ and symmetric bilaterally [PT] : posterior tibial 2+ and symmetric bilaterally [Normal] : Alert and in no acute distress [Poor Appearance] : well-appearing [Acute Distress] : not in acute distress [Obese] : not obese [de-identified] : The patient has no respiratory distress. Mood and affect are normal. The patient is alert and oriented to person, place and time.\par Examination of the lumbar spine demonstrates tenderness to the right of the midline. There is mild muscle spasm. There is no deformity. Lumbar flexion is 90°, right lateral flexion 10° and left lateral flexion 10°. Straight leg raise test is negative. Lower extremity neurologic exam is intact with regard to sensation, motor function and deep tendon reflexes.  Trendelenburg is negative.  There is no pain with rotation of the hips.  There is no pain with motion of the knees.  There is mild tenderness on the lateral aspect of the right thigh.  There is no calf tenderness or swelling.  The skin is intact.  There is no lymphedema. [de-identified] : AP and lateral x-rays of the lumbar spine demonstrate no acute fracture or dislocation.  There are degenerative changes.  There is calcification of the vascular structures.  AP and lateral x-rays of the right hip with AP of the pelvis demonstrate no fracture or dislocation.  There are no bony abnormalities.

## 2023-12-11 ENCOUNTER — APPOINTMENT (OUTPATIENT)
Dept: NEUROLOGY | Facility: CLINIC | Age: 63
End: 2023-12-11
Payer: MEDICAID

## 2023-12-11 VITALS
HEIGHT: 65 IN | BODY MASS INDEX: 26.66 KG/M2 | DIASTOLIC BLOOD PRESSURE: 54 MMHG | HEART RATE: 85 BPM | SYSTOLIC BLOOD PRESSURE: 121 MMHG | WEIGHT: 160 LBS

## 2023-12-11 DIAGNOSIS — S74.21XA: ICD-10-CM

## 2023-12-11 PROCEDURE — 99205 OFFICE O/P NEW HI 60 MIN: CPT

## 2023-12-11 RX ORDER — GABAPENTIN 300 MG/1
300 CAPSULE ORAL
Qty: 60 | Refills: 0 | Status: DISCONTINUED | COMMUNITY
Start: 2020-02-07 | End: 2023-12-11

## 2023-12-11 RX ORDER — DULOXETINE HYDROCHLORIDE 30 MG/1
30 CAPSULE, DELAYED RELEASE PELLETS ORAL
Qty: 30 | Refills: 0 | Status: ACTIVE | COMMUNITY
Start: 2023-12-11 | End: 1900-01-01